# Patient Record
Sex: FEMALE | Race: WHITE | NOT HISPANIC OR LATINO | Employment: FULL TIME | URBAN - METROPOLITAN AREA
[De-identification: names, ages, dates, MRNs, and addresses within clinical notes are randomized per-mention and may not be internally consistent; named-entity substitution may affect disease eponyms.]

---

## 2022-05-25 ENCOUNTER — OFFICE VISIT (OUTPATIENT)
Dept: INTERNAL MEDICINE | Age: 22
End: 2022-05-25

## 2022-05-25 VITALS
WEIGHT: 119 LBS | HEIGHT: 61 IN | SYSTOLIC BLOOD PRESSURE: 120 MMHG | OXYGEN SATURATION: 98 % | HEART RATE: 118 BPM | BODY MASS INDEX: 22.47 KG/M2 | DIASTOLIC BLOOD PRESSURE: 80 MMHG | TEMPERATURE: 97.6 F

## 2022-05-25 DIAGNOSIS — F32.A DEPRESSIVE DISORDER: ICD-10-CM

## 2022-05-25 DIAGNOSIS — F41.9 ANXIETY: ICD-10-CM

## 2022-05-25 DIAGNOSIS — Z11.59 NEED FOR HEPATITIS C SCREENING TEST: ICD-10-CM

## 2022-05-25 DIAGNOSIS — Z00.00 ANNUAL PHYSICAL EXAM: Primary | ICD-10-CM

## 2022-05-25 PROBLEM — B00.1 HERPES LABIALIS: Status: ACTIVE | Noted: 2019-07-18

## 2022-05-25 PROBLEM — N92.6 IRREGULAR PERIODS: Status: ACTIVE | Noted: 2019-07-18

## 2022-05-25 PROCEDURE — 99385 PREV VISIT NEW AGE 18-39: CPT | Performed by: NURSE PRACTITIONER

## 2022-05-25 PROCEDURE — 99203 OFFICE O/P NEW LOW 30 MIN: CPT | Performed by: NURSE PRACTITIONER

## 2022-05-25 RX ORDER — DULOXETIN HYDROCHLORIDE 30 MG/1
CAPSULE, DELAYED RELEASE ORAL
COMMUNITY
End: 2022-05-25 | Stop reason: SDUPTHER

## 2022-05-25 RX ORDER — ACYCLOVIR 50 MG/G
1 OINTMENT TOPICAL 2 TIMES DAILY
Qty: 14 G | Refills: 0 | Status: SHIPPED | OUTPATIENT
Start: 2022-05-25 | End: 2022-06-01

## 2022-05-25 RX ORDER — DULOXETIN HYDROCHLORIDE 60 MG/1
CAPSULE, DELAYED RELEASE ORAL
COMMUNITY
End: 2022-05-25 | Stop reason: SDUPTHER

## 2022-05-25 RX ORDER — DESOGESTREL AND ETHINYL ESTRADIOL 0.15-0.03
KIT ORAL
COMMUNITY
End: 2023-02-21

## 2022-05-25 RX ORDER — DULOXETIN HYDROCHLORIDE 60 MG/1
60 CAPSULE, DELAYED RELEASE ORAL DAILY
Qty: 90 CAPSULE | Refills: 1 | Status: SHIPPED | OUTPATIENT
Start: 2022-05-25 | End: 2022-11-21 | Stop reason: SDUPTHER

## 2022-05-25 RX ORDER — DULOXETIN HYDROCHLORIDE 30 MG/1
30 CAPSULE, DELAYED RELEASE ORAL DAILY
Qty: 90 CAPSULE | Refills: 1 | Status: SHIPPED | OUTPATIENT
Start: 2022-05-25 | End: 2022-11-21 | Stop reason: SDUPTHER

## 2022-05-25 NOTE — PROGRESS NOTES
"    I N T E R N A L  M E D I C I N E  RONY Hernandez       ENCOUNTER DATE:  05/25/2022    Raven Lundy / 22 y.o. / female      CHIEF COMPLAINT     Annual Exam, establish care with new PCP, follow up Anxiety/Depression    VITALS     Vitals:    05/25/22 1051   BP: 120/80   Pulse: 118   Temp: 97.6 °F (36.4 °C)   TempSrc: Temporal   SpO2: 98%   Weight: 54 kg (119 lb)   Height: 154.9 cm (61\")       BP Readings from Last 3 Encounters:   05/25/22 120/80     Wt Readings from Last 3 Encounters:   05/25/22 54 kg (119 lb)      Body mass index is 22.48 kg/m².    Blood pressure readings recorded on patient flowsheet:  No flowsheet data found.       MEDICATIONS     Current Outpatient Medications on File Prior to Visit   Medication Sig Dispense Refill   • desogestrel-ethinyl estradiol (APRI) 0.15-30 MG-MCG per tablet Isibloom 0.15 mg-0.03 mg tablet   TAKE 1 TABLET BY MOUTH EVERY DAY       No current facility-administered medications on file prior to visit.         HISTORY OF PRESENT ILLNESS     Raven is a 22 year old female whom presents for annual health maintenance visit and to establish new PCP  Anxiety/Depression: patient recently moved to [place] from [place] to go to  School of [employer]. States psychiatrist in recent place of living prescribed Duloxetine and that the medication has  been effective. Discussion of Behavioral Health/Psychiatrist- patient asked if PCP could prescribe.     · General health: good  · Lifestyle:  · Attempting to lose weight?: No   · Diet: eats moderately healthy  · Exercise: exercises nearly every day  · Tobacco: Never used   · Alcohol: occasional/infrequent  · Work: Part-time  · Reproductive health:  · Sexually active?: No   · Sexual problems?: No problems  · Concern for STD?: No    · Sees Gynecologist?: Yes   · Desi/Postmenopausal?: No   · Domestic abuse concerns: No   · Depression Screening:      PHQ-2/PHQ-9 Depression Screening 5/25/2022   Little Interest or Pleasure in Doing Things 0-->not " at all   Feeling Down, Depressed or Hopeless 0-->not at all   PHQ-9: Brief Depression Severity Measure Score 0         PHQ-2: 0 (Not depressed)   PHQ-9: 0 (Negative screening for depression)    Patient Care Team:  Kelly Sandoval APRN as PCP - General (Family Medicine)      ALLERGIES  No Known Allergies     PFSH:     The following portions of the patient's history were reviewed and updated as appropriate: Allergies / Current Medications / Past Medical History / Surgical History / Social History / Family History    PROBLEM LIST   Patient Active Problem List   Diagnosis   • Anxiety   • Depressive disorder   • Herpes labialis   • Irregular periods       PAST MEDICAL HISTORY  Past Medical History:   Diagnosis Date   • Anxiety    • Depression        SURGICAL HISTORY  History reviewed. No pertinent surgical history.    SOCIAL HISTORY  Social History     Socioeconomic History   • Marital status: Single   Tobacco Use   • Smoking status: Never Smoker   • Smokeless tobacco: Never Used   Substance and Sexual Activity   • Drug use: Yes       FAMILY HISTORY  Family History   Problem Relation Age of Onset   • Hypertension Father    • Hyperlipidemia Father        IMMUNIZATION HISTORY  Immunization History   Administered Date(s) Administered   • COVID-19 (MODERNA) 1st, 2nd, 3rd Dose Only 01/17/2022   • DTaP, Unspecified 2002, 2002, 2002, 05/08/2003, 05/30/2007   • Flu Vaccine Split Quad 11/02/2021   • Fluzone Split Quad (Multi-dose) 10/01/2020   • HPV, Unspecified 06/05/2013   • Hep B, Adolescent or Pediatric 2002, 2002, 2002   • Hepb Hepatitis B Vaccine Heplisav-b 09/23/2021   • HiB 2002, 2002, 2002, 05/08/2003   • Hpv9 08/31/2017, 08/27/2019   • IPV 2002, 2002, 2002, 05/30/2007   • MMR 01/06/2003, 05/30/2007, 09/23/2021   • Meningococcal B,(Bexsero) 07/18/2019, 08/27/2019   • Meningococcal Conjugate 07/18/2019   • Meningococcal MCV4P (Menactra) 06/05/2013    • PEDS-Pneumococcal Conjugate (PCV7) 2002, 2002, 2002, 05/08/2003   • Tdap 06/05/2013   • Varicella 01/06/2003, 05/30/2007, 11/02/2021         REVIEW OF SYSTEMS     Review of Systems   Constitutional: Negative.    HENT: Negative.    Eyes: Negative.    Respiratory: Negative.    Cardiovascular: Negative.    Gastrointestinal: Negative.    Genitourinary: Negative.    Musculoskeletal: Negative.    Skin: Negative.    Allergic/Immunologic: Negative.    Neurological: Negative.    Psychiatric/Behavioral: The patient is nervous/anxious.        PHYSICAL EXAMINATION     Physical Exam  Constitutional:       Appearance: Normal appearance. She is normal weight.   HENT:      Head: Normocephalic and atraumatic.      Right Ear: Tympanic membrane normal.      Left Ear: Tympanic membrane normal.      Nose: Nose normal.      Mouth/Throat:      Mouth: Mucous membranes are moist.   Eyes:      Extraocular Movements: Extraocular movements intact.      Pupils: Pupils are equal, round, and reactive to light.   Cardiovascular:      Rate and Rhythm: Normal rate and regular rhythm.      Pulses: Normal pulses.      Heart sounds: Normal heart sounds.   Pulmonary:      Effort: Pulmonary effort is normal.      Breath sounds: Normal breath sounds.   Abdominal:      General: Abdomen is flat. Bowel sounds are normal.   Musculoskeletal:         General: Normal range of motion.      Cervical back: Normal range of motion and neck supple.   Skin:     General: Skin is warm and dry.      Capillary Refill: Capillary refill takes less than 2 seconds.   Neurological:      Mental Status: She is alert and oriented to person, place, and time.   Psychiatric:         Mood and Affect: Mood normal.         Behavior: Behavior normal.         Thought Content: Thought content normal.         Judgment: Judgment normal.         REVIEWED DATA      Labs:    Lab Results   Component Value Date     05/25/2022    K 4.4 05/25/2022    CALCIUM 9.9  05/25/2022    AST 16 05/25/2022    ALT 11 05/25/2022    BUN 10 05/25/2022    CREATININE 1.02 (H) 05/25/2022       Lab Results   Component Value Date    GLUCOSE 70 05/25/2022    HGBA1C 5.4 05/25/2022    TSH 1.220 05/25/2022    FREET4 1.09 05/25/2022       Lab Results   Component Value Date     (H) 05/25/2022    HDL 67 05/25/2022    TRIG 163 (H) 05/25/2022    CHOLHDLRATIO 3.4 05/25/2022       No results found for: [id number]     Lab Results   Component Value Date    WBC 7.0 05/25/2022    HGB 14.6 05/25/2022    MCV 87 05/25/2022     05/25/2022       Lab Results   Component Value Date    PROTEIN Trace 05/25/2022    GLUCOSEU Negative 05/25/2022    BLOODU Trace (A) 05/25/2022    NITRITEU Negative 05/25/2022    LEUKOCYTESUR Negative 05/25/2022          Lab Results   Component Value Date    HEPCVIRUSABY 0.1 05/25/2022       Imaging:        Medical Tests:        ASSESSMENT & PLAN     ANNUAL WELLNESS EXAM / PHYSICAL     Other medical problems addressed today:  Problem List Items Addressed This Visit        Mental Health    Anxiety    Depressive disorder    Relevant Medications    DULoxetine (CYMBALTA) 30 MG capsule    DULoxetine (CYMBALTA) 60 MG capsule      Other Visit Diagnoses     Annual physical exam    -  Primary    Relevant Orders    CBC & Differential (Completed)    Comprehensive Metabolic Panel (Completed)    Hemoglobin A1c (Completed)    Hepatitis C Antibody (Completed)    Lipid Panel With / Chol / HDL Ratio (Completed)    T4, Free (Completed)    TSH (Completed)    Urinalysis With Microscopic If Indicated (No Culture) - Urine, Clean Catch (Completed)    Need for hepatitis C screening test        Relevant Orders    Hepatitis C Antibody (Completed)          Summary/Discussion:     • Follow up OB/GYN for yearly Pap and Mammogram  • I spent 30 min in direct care of this patient on this date of service. This time includes times spent by me in the following activities: Preparing for the visit, obtaining and/or  reviewing a separately obtained history, performing a medically appropriate examination and/or evaluation, reviewing medical records, reviewing tests, ordering medications, tests, or procedures, counseling and educating the patient, documenting information in the medical record and reviewing office note/correspondence from other providers.     Return in about 6 months (around 11/25/2022) for Next scheduled follow up.      HEALTHCARE MAINTENANCE ISSUES     Cancer Screening:  · Colon: Initial/Next screening at age: 45  · Repeat colon cancer screening: N/A at this time  · Breast: Recommended monthly self exams; annual professional exam  · Mammogram: every 1 year  · Cervical: 1 year  · Skin: Monthly self skin examination, annual exam by health professional  · Lung: Does not meet criteria for lung cancer screening.   · Other:    Screening Labs & Tests:  · Lab results reviewed & discussed with the patient or test orders placed today.  · EKG:  · CV Screening: No special screening needed  · DEXA (65+ or postmenopausal with risk factors):   · HEP C (If born [birth year]0215, or risk factors): Not indicated  · Other:     Immunization/Vaccinations (to be given today unless deferred by patient)  · Influenza: Recommended annual influenza vaccine  · Hepatitis A: Up to date  · Hepatitis B: Up to date  · Tetanus/Pertussis: Up to date  · Pneumovax: Not needed at this time  · Shingles: Not needed at this time  · COVID: DEVORA BOBO COVID: Completed primary vaccine series and booster    Lifestyle Counseling:  · Lifestyle Modifications: Continue good lifestyle choices/modifications, Discussed better management of stress/anxiety and Discussed sexual issues, safe sex practices, contraception  · Safety Issues: Always wear seatbelt, Avoid texting while driving   · Use sunscreen, regular skin examination  · Recommended annual dental/vision examination.  · Emotional/Stress/Sleep: Reviewed and  given when appropriate      Health Maintenance   Topic  Date Due   • COVID-19 Vaccine (2 - Moderna series) 02/14/2022   • INFLUENZA VACCINE  08/01/2022   • LIPID PANEL  05/25/2023   • ANNUAL PHYSICAL  05/26/2023   • TDAP/TD VACCINES (2 - Td or Tdap) 06/05/2023   • HEPATITIS C SCREENING  Completed   • MENINGOCOCCAL VACCINE  Completed   • HPV VACCINES  Completed   • Pneumococcal Vaccine 0-64  Aged Out           *Examiner was wearing mask protection during the entire duration of the visit. Patient was masked the entire time. Minimum social distance of 6 ft maintained entire visit except if physical contact was necessary as documented.       Template created by RONY Hernandez

## 2022-05-26 DIAGNOSIS — E78.5 HYPERLIPIDEMIA LDL GOAL <100: Primary | ICD-10-CM

## 2022-05-26 LAB
ALBUMIN SERPL-MCNC: 4.4 G/DL (ref 3.9–5)
ALBUMIN/GLOB SERPL: 1.5 {RATIO} (ref 1.2–2.2)
ALP SERPL-CCNC: 73 IU/L (ref 42–106)
ALT SERPL-CCNC: 11 IU/L (ref 0–32)
APPEARANCE UR: CLEAR
AST SERPL-CCNC: 16 IU/L (ref 0–40)
BACTERIA #/AREA URNS HPF: NORMAL /[HPF]
BASOPHILS # BLD AUTO: 0 X10E3/UL (ref 0–0.2)
BASOPHILS NFR BLD AUTO: 0 %
BILIRUB SERPL-MCNC: 0.3 MG/DL (ref 0–1.2)
BILIRUB UR QL STRIP: NEGATIVE
BUN SERPL-MCNC: 10 MG/DL (ref 6–20)
BUN/CREAT SERPL: 10 (ref 9–23)
CALCIUM SERPL-MCNC: 9.9 MG/DL (ref 8.7–10.2)
CASTS URNS QL MICRO: NORMAL /LPF
CHLORIDE SERPL-SCNC: 102 MMOL/L (ref 96–106)
CHOLEST SERPL-MCNC: 231 MG/DL (ref 100–199)
CHOLEST/HDLC SERPL: 3.4 RATIO (ref 0–4.4)
CO2 SERPL-SCNC: 22 MMOL/L (ref 20–29)
COLOR UR: YELLOW
CREAT SERPL-MCNC: 1.02 MG/DL (ref 0.57–1)
EGFRCR SERPLBLD CKD-EPI 2021: 81 ML/MIN/1.73
EOSINOPHIL # BLD AUTO: 0.2 X10E3/UL (ref 0–0.4)
EOSINOPHIL NFR BLD AUTO: 3 %
EPI CELLS #/AREA URNS HPF: NORMAL /HPF (ref 0–10)
ERYTHROCYTE [DISTWIDTH] IN BLOOD BY AUTOMATED COUNT: 13.2 % (ref 11.7–15.4)
GLOBULIN SER CALC-MCNC: 2.9 G/DL (ref 1.5–4.5)
GLUCOSE SERPL-MCNC: 70 MG/DL (ref 65–99)
GLUCOSE UR QL STRIP: NEGATIVE
HBA1C MFR BLD: 5.4 % (ref 4.8–5.6)
HCT VFR BLD AUTO: 43.8 % (ref 34–46.6)
HCV AB S/CO SERPL IA: 0.1 S/CO RATIO (ref 0–0.9)
HDLC SERPL-MCNC: 67 MG/DL
HGB BLD-MCNC: 14.6 G/DL (ref 11.1–15.9)
HGB UR QL STRIP: ABNORMAL
IMM GRANULOCYTES # BLD AUTO: 0 X10E3/UL (ref 0–0.1)
IMM GRANULOCYTES NFR BLD AUTO: 0 %
KETONES UR QL STRIP: NEGATIVE
LDLC SERPL CALC-MCNC: 135 MG/DL (ref 0–99)
LEUKOCYTE ESTERASE UR QL STRIP: NEGATIVE
LYMPHOCYTES # BLD AUTO: 2.2 X10E3/UL (ref 0.7–3.1)
LYMPHOCYTES NFR BLD AUTO: 32 %
MCH RBC QN AUTO: 28.9 PG (ref 26.6–33)
MCHC RBC AUTO-ENTMCNC: 33.3 G/DL (ref 31.5–35.7)
MCV RBC AUTO: 87 FL (ref 79–97)
MICRO URNS: ABNORMAL
MONOCYTES # BLD AUTO: 0.4 X10E3/UL (ref 0.1–0.9)
MONOCYTES NFR BLD AUTO: 6 %
NEUTROPHILS # BLD AUTO: 4.1 X10E3/UL (ref 1.4–7)
NEUTROPHILS NFR BLD AUTO: 59 %
NITRITE UR QL STRIP: NEGATIVE
PH UR STRIP: 6 [PH] (ref 5–7.5)
PLATELET # BLD AUTO: 325 X10E3/UL (ref 150–450)
POTASSIUM SERPL-SCNC: 4.4 MMOL/L (ref 3.5–5.2)
PROT SERPL-MCNC: 7.3 G/DL (ref 6–8.5)
PROT UR QL STRIP: ABNORMAL
RBC # BLD AUTO: 5.06 X10E6/UL (ref 3.77–5.28)
RBC #/AREA URNS HPF: NORMAL /HPF (ref 0–2)
SODIUM SERPL-SCNC: 140 MMOL/L (ref 134–144)
SP GR UR STRIP: 1.02 (ref 1–1.03)
T4 FREE SERPL-MCNC: 1.09 NG/DL (ref 0.82–1.77)
TRIGL SERPL-MCNC: 163 MG/DL (ref 0–149)
TSH SERPL DL<=0.005 MIU/L-ACNC: 1.22 UIU/ML (ref 0.45–4.5)
UROBILINOGEN UR STRIP-MCNC: 0.2 MG/DL (ref 0.2–1)
VLDLC SERPL CALC-MCNC: 29 MG/DL (ref 5–40)
WBC # BLD AUTO: 7 X10E3/UL (ref 3.4–10.8)
WBC #/AREA URNS HPF: NORMAL /HPF (ref 0–5)

## 2022-11-21 ENCOUNTER — PATIENT ROUNDING (BHMG ONLY) (OUTPATIENT)
Dept: INTERNAL MEDICINE | Facility: CLINIC | Age: 22
End: 2022-11-21

## 2022-11-21 ENCOUNTER — OFFICE VISIT (OUTPATIENT)
Dept: INTERNAL MEDICINE | Facility: CLINIC | Age: 22
End: 2022-11-21

## 2022-11-21 DIAGNOSIS — E78.9 BORDERLINE HIGH CHOLESTEROL: ICD-10-CM

## 2022-11-21 DIAGNOSIS — B00.1 HERPES LABIALIS WITHOUT COMPLICATION: Primary | Chronic | ICD-10-CM

## 2022-11-21 DIAGNOSIS — F41.9 ANXIETY: Chronic | ICD-10-CM

## 2022-11-21 PROCEDURE — 99214 OFFICE O/P EST MOD 30 MIN: CPT | Performed by: INTERNAL MEDICINE

## 2022-11-21 RX ORDER — DULOXETIN HYDROCHLORIDE 60 MG/1
60 CAPSULE, DELAYED RELEASE ORAL DAILY
Qty: 90 CAPSULE | Refills: 1 | Status: SHIPPED | OUTPATIENT
Start: 2022-11-21 | End: 2023-03-30

## 2022-11-21 RX ORDER — VALACYCLOVIR HYDROCHLORIDE 1 G/1
1000 TABLET, FILM COATED ORAL 2 TIMES DAILY
Qty: 30 TABLET | Refills: 0 | Status: SHIPPED | OUTPATIENT
Start: 2022-11-21

## 2022-11-21 RX ORDER — DULOXETIN HYDROCHLORIDE 30 MG/1
30 CAPSULE, DELAYED RELEASE ORAL DAILY
Qty: 90 CAPSULE | Refills: 1 | Status: SHIPPED | OUTPATIENT
Start: 2022-11-21 | End: 2023-03-30

## 2022-11-21 NOTE — PROGRESS NOTES
"Chief Complaint  Depression and Anxiety (New PT )    Subjective        Raven Lundy presents to [facility]  History of Present Illness  23 yo woman here to establish- has been told she has high cholesterol -   She has been treated for anxiety for several years- sees a therapist as well but looking for someone local.  Feels controlled with meds.    Discussed safe sex, alcohol use.     Objective   Vital Signs:  BP 98/66   Pulse 70   Ht 154.9 cm (61\")   Wt 54 kg (119 lb)   BMI 22.48 kg/m²   Estimated body mass index is 22.48 kg/m² as calculated from the following:    Height as of this encounter: 154.9 cm (61\").    Weight as of this encounter: 54 kg (119 lb).    BMI is within normal parameters. No other follow-up for BMI required.      Physical Exam  Constitutional:       Appearance: Normal appearance.   HENT:      Head: Normocephalic.   Cardiovascular:      Rate and Rhythm: Normal rate and regular rhythm.   Pulmonary:      Effort: Pulmonary effort is normal.      Breath sounds: Normal breath sounds.   Abdominal:      General: Abdomen is flat. Bowel sounds are normal.      Palpations: Abdomen is soft.   Musculoskeletal:      Right lower leg: No edema.      Left lower leg: No edema.   Skin:     General: Skin is warm and dry.   Psychiatric:         Mood and Affect: Mood normal.         Thought Content: Thought content normal.        Result Review :                Assessment and Plan   Diagnoses and all orders for this visit:    1. Herpes labialis without complication (Primary)  Comments:  rx for Valtrex for prn use given.  Call with recurrent problems.   Orders:  -     valACYclovir (Valtrex) 1000 MG tablet; Take 1 tablet by mouth 2 (Two) Times a Day.  Dispense: 30 tablet; Refill: 0    2. Anxiety  Comments:  Controlled, will help her find therapist locally.   Orders:  -     DULoxetine (CYMBALTA) 60 MG capsule; Take 1 capsule by mouth Daily.  Dispense: 90 capsule; Refill: 1  -     " DULoxetine (CYMBALTA) 30 MG capsule; Take 1 capsule by mouth Daily.  Dispense: 90 capsule; Refill: 1    3. Borderline high cholesterol  Comments:  reviewed labs extensively, they are good at this point- continue healthy lifestyle- recheck in a year.              Follow Up   No follow-ups on file.  Patient was given instructions and counseling regarding her condition or for health maintenance advice. Please see specific information pulled into the AVS if appropriate.

## 2022-11-21 NOTE — PROGRESS NOTES
November 21, 2022    Hello, may I speak with Raven Lundy?    My name is Ravi      I am  with [facility]  [address] [place] [phone].    Before we get started may I verify your date of birth? 2000    I am calling to officially welcome you to our practice and ask about your recent visit. Is this a good time to talk? yes    Tell me about your visit with us. What things went well?  Everything       We're always looking for ways to make our patients' experiences even better. Do you have recommendations on ways we may improve?  no    Overall were you satisfied with your first visit to our practice? yes       I appreciate you taking the time to speak with me today. Is there anything else I can do for you? no      Thank you, and have a great day.

## 2022-11-22 VITALS
BODY MASS INDEX: 22.47 KG/M2 | HEART RATE: 70 BPM | DIASTOLIC BLOOD PRESSURE: 66 MMHG | WEIGHT: 119 LBS | SYSTOLIC BLOOD PRESSURE: 98 MMHG | HEIGHT: 61 IN

## 2023-01-24 ENCOUNTER — OFFICE VISIT (OUTPATIENT)
Dept: GASTROENTEROLOGY | Facility: CLINIC | Age: 23
End: 2023-01-24
Payer: COMMERCIAL

## 2023-01-24 ENCOUNTER — CLINICAL SUPPORT (OUTPATIENT)
Dept: INTERNAL MEDICINE | Facility: CLINIC | Age: 23
End: 2023-01-24
Payer: COMMERCIAL

## 2023-01-24 VITALS
SYSTOLIC BLOOD PRESSURE: 116 MMHG | WEIGHT: 129.8 LBS | DIASTOLIC BLOOD PRESSURE: 76 MMHG | HEART RATE: 96 BPM | TEMPERATURE: 97.3 F | BODY MASS INDEX: 24.51 KG/M2 | HEIGHT: 61 IN

## 2023-01-24 DIAGNOSIS — R15.2 FECAL URGENCY: ICD-10-CM

## 2023-01-24 DIAGNOSIS — R10.30 LOWER ABDOMINAL PAIN: ICD-10-CM

## 2023-01-24 DIAGNOSIS — R19.7 DIARRHEA, UNSPECIFIED TYPE: Primary | ICD-10-CM

## 2023-01-24 PROCEDURE — 99214 OFFICE O/P EST MOD 30 MIN: CPT | Performed by: NURSE PRACTITIONER

## 2023-01-24 NOTE — PROGRESS NOTES
Chief Complaint   Patient presents with   • Diarrhea       Raven Lundy is a 23 y.o. female who presents with diarrhea.    HPI  23-year-old female presents today as a new patient to our clinic with worsening diarrhea.  She will be establishing with myself and Dr. Fletcher.  She tells me for the last several months she has had worsening diarrhea with fecal urgency every time she eats.  She tells me she occasionally had diarrhea in the past but nothing like it is now.  She does go to the [employer] for classes and admits when she was home over winter break her parents told her that her bowels were definitely not normal and she should get that checked.  She tells me it does not really seem to matter what she eats she will have diarrhea with abdominal pain and cramping, gas and bloating with fecal urgency.  She has never had an EGD or screening colonoscopy before.  She denies any new medications or any recent antibiotics.  She does wonder if maybe dairy is involved?  She denies any dysphagia, reflux, nausea and vomiting, rectal bleeding or melena.  She admits her appetite is good and her weight is stable.  She denies any significant GI family history at this time.  She does not take any probiotics or fiber supplementation.  She has not tried any medications over-the-counter because she tells me it is just so random she does not know when it is going to be bad.  Past Medical History:   Diagnosis Date   • Anxiety    • Depression        History reviewed. No pertinent surgical history.      Current Outpatient Medications:   •  desogestrel-ethinyl estradiol (APRI) 0.15-30 MG-MCG per tablet, Isibloom 0.15 mg-0.03 mg tablet  TAKE 1 TABLET BY MOUTH EVERY DAY, Disp: , Rfl:   •  DULoxetine (CYMBALTA) 30 MG capsule, Take 1 capsule by mouth Daily., Disp: 90 capsule, Rfl: 1  •  DULoxetine (CYMBALTA) 60 MG capsule, Take 1 capsule by mouth Daily., Disp: 90 capsule, Rfl: 1  •  valACYclovir (Valtrex) 1000 MG tablet, Take  1 tablet by mouth 2 (Two) Times a Day., Disp: 30 tablet, Rfl: 0    No Known Allergies    Social History     Socioeconomic History   • Marital status: Single   Tobacco Use   • Smoking status: Former     Types: Electronic Cigarette   • Smokeless tobacco: Never   Substance and Sexual Activity   • Alcohol use: Yes     Alcohol/week: 9.0 standard drinks     Types: 4 Glasses of wine, 5 Shots of liquor per week     Comment: socially   • Drug use: Yes   • Sexual activity: Yes     Partners: Male     Birth control/protection: Pill       Family History   Problem Relation Age of Onset   • Anxiety disorder Mother    • Hypertension Father    • Hyperlipidemia Father    • No Known Problems Sister    • Alcohol abuse Paternal Grandfather        Review of Systems   Constitutional: Negative for appetite change, chills, diaphoresis, fatigue, fever and unexpected weight change.   HENT: Negative for nosebleeds, postnasal drip, sore throat, trouble swallowing and voice change.    Respiratory: Negative for cough, choking, chest tightness, shortness of breath, wheezing and stridor.    Cardiovascular: Negative for chest pain, palpitations and leg swelling.   Gastrointestinal: Positive for abdominal pain and diarrhea. Negative for abdominal distention, anal bleeding, blood in stool, constipation, nausea, rectal pain and vomiting.   Endocrine: Negative for polydipsia, polyphagia and polyuria.   Musculoskeletal: Negative for gait problem.   Skin: Negative for rash and wound.   Allergic/Immunologic: Negative for food allergies.   Neurological: Negative for dizziness, speech difficulty and light-headedness.   Psychiatric/Behavioral: Negative for confusion, self-injury, sleep disturbance and suicidal ideas.       Vitals:    01/24/23 0928   BP: 116/76   Pulse: 96   Temp: 97.3 °F (36.3 °C)       Physical Exam  Constitutional:       General: She is not in acute distress.     Appearance: She is well-developed. She is not ill-appearing.   HENT:       Head: Normocephalic.   Eyes:      Pupils: Pupils are equal, round, and reactive to light.   Cardiovascular:      Rate and Rhythm: Normal rate and regular rhythm.      Heart sounds: Normal heart sounds.   Pulmonary:      Effort: Pulmonary effort is normal.      Breath sounds: Normal breath sounds.   Abdominal:      General: Bowel sounds are normal. There is no distension.      Palpations: Abdomen is soft. There is no mass.      Tenderness: There is no abdominal tenderness. There is no guarding or rebound.      Hernia: No hernia is present.   Musculoskeletal:         General: Normal range of motion.   Skin:     General: Skin is warm and dry.   Neurological:      Mental Status: She is alert and oriented to person, place, and time.   Psychiatric:         Speech: Speech normal.         Behavior: Behavior normal.         Judgment: Judgment normal.         No radiology results for the last 7 days       Assessment and plan    1. Diarrhea, unspecified type  - Food Allergy Profile  - Celiac Comprehensive Panel  - Gastrointestinal Panel, PCR - Stool, Per Rectum  - Clostridioides difficile Toxin, PCR - Stool, Per Rectum  - Calprotectin, Fecal - Stool, Per Rectum    2. Fecal urgency  - Food Allergy Profile  - Celiac Comprehensive Panel  - Gastrointestinal Panel, PCR - Stool, Per Rectum  - Clostridioides difficile Toxin, PCR - Stool, Per Rectum  - Calprotectin, Fecal - Stool, Per Rectum    3. Lower abdominal pain  - Food Allergy Profile  - Celiac Comprehensive Panel  - Gastrointestinal Panel, PCR - Stool, Per Rectum  - Clostridioides difficile Toxin, PCR - Stool, Per Rectum  - Calprotectin, Fecal - Stool, Per Rectum    Reviewed medical history with her today.  Having worsening diarrhea with fecal urgency after she eats.  I do wonder if there is a food sensitivity involved here?  We will check a food allergy profile and a celiac panel to start.  We will also do some stool studies.  Would like her to start daily fiber  supplementation.  Recommend a high-fiber diet.  Patient to call the office next week with an update.  Patient to follow-up with me in 1 month.  Patient is agreeable to the plan.

## 2023-01-25 LAB
ENDOMYSIUM IGA SER QL: NEGATIVE
GLIADIN PEPTIDE IGA SER-ACNC: 7 UNITS (ref 0–19)
GLIADIN PEPTIDE IGG SER-ACNC: 4 UNITS (ref 0–19)
IGA SERPL-MCNC: 189 MG/DL (ref 87–352)
TTG IGA SER-ACNC: <2 U/ML (ref 0–3)
TTG IGG SER-ACNC: <2 U/ML (ref 0–5)

## 2023-01-27 LAB
CLAM IGE QN: <0.1 KU/L
CODFISH IGE QN: <0.1 KU/L
CONV CLASS DESCRIPTION: NORMAL
CORN IGE QN: <0.1 KU/L
COW MILK IGE QN: <0.1 KU/L
EGG WHITE IGE QN: <0.1 KU/L
PEANUT IGE QN: <0.1 KU/L
SCALLOP IGE QN: <0.1 KU/L
SESAME SEED IGE QN: <0.1 KU/L
SHRIMP IGE QN: <0.1 KU/L
SOYBEAN IGE QN: <0.1 KU/L
WALNUT IGE QN: <0.1 KU/L
WHEAT IGE QN: <0.1 KU/L

## 2023-01-30 ENCOUNTER — TELEPHONE (OUTPATIENT)
Dept: GASTROENTEROLOGY | Facility: CLINIC | Age: 23
End: 2023-01-30
Payer: COMMERCIAL

## 2023-01-30 NOTE — TELEPHONE ENCOUNTER
----- Message from RONY Gonzalez sent at 1/27/2023  1:01 PM EST -----  Food allergy profile is normal.  Celiac profile is normal.  Thanks

## 2023-02-03 ENCOUNTER — OFFICE VISIT (OUTPATIENT)
Dept: INTERNAL MEDICINE | Facility: CLINIC | Age: 23
End: 2023-02-03
Payer: COMMERCIAL

## 2023-02-03 VITALS
HEIGHT: 61 IN | BODY MASS INDEX: 24.35 KG/M2 | DIASTOLIC BLOOD PRESSURE: 70 MMHG | SYSTOLIC BLOOD PRESSURE: 112 MMHG | TEMPERATURE: 98.2 F | WEIGHT: 129 LBS

## 2023-02-03 DIAGNOSIS — F32.A ANXIETY AND DEPRESSION: Primary | ICD-10-CM

## 2023-02-03 DIAGNOSIS — F41.9 ANXIETY AND DEPRESSION: Primary | ICD-10-CM

## 2023-02-03 PROCEDURE — 99213 OFFICE O/P EST LOW 20 MIN: CPT | Performed by: PHYSICIAN ASSISTANT

## 2023-02-03 RX ORDER — VENLAFAXINE HYDROCHLORIDE 37.5 MG/1
37.5 CAPSULE, EXTENDED RELEASE ORAL DAILY
Qty: 90 CAPSULE | Refills: 0 | Status: SHIPPED | OUTPATIENT
Start: 2023-02-03 | End: 2023-03-30

## 2023-02-03 RX ORDER — LEVOMEFOLATE/ALGAL OIL 7.5-90.314
1 CAPSULE ORAL DAILY
Qty: 90 CAPSULE | Refills: 1 | Status: SHIPPED | OUTPATIENT
Start: 2023-02-03 | End: 2023-03-30

## 2023-02-03 NOTE — PROGRESS NOTES
Subjective   Chief Complaint   Patient presents with   • Anxiety       History of Present Illness      Pt is here today for fup on her anxiety and depression. She had genesight testing done. She has been on Cymbalta for about 2 years and does not feel it is very effective. She reports being moodier during her cycle. Her mother takes Effexor and has done well with it.   Patient Active Problem List   Diagnosis   • Anxiety   • Depressive disorder   • Herpes labialis   • Irregular periods       No Known Allergies    Current Outpatient Medications on File Prior to Visit   Medication Sig Dispense Refill   • DULoxetine (CYMBALTA) 30 MG capsule Take 1 capsule by mouth Daily. 90 capsule 1   • DULoxetine (CYMBALTA) 60 MG capsule Take 1 capsule by mouth Daily. 90 capsule 1   • Unable to find 1 each 1 (One) Time. IUD     • valACYclovir (Valtrex) 1000 MG tablet Take 1 tablet by mouth 2 (Two) Times a Day. 30 tablet 0   • desogestrel-ethinyl estradiol (APRI) 0.15-30 MG-MCG per tablet Isibloom 0.15 mg-0.03 mg tablet   TAKE 1 TABLET BY MOUTH EVERY DAY       No current facility-administered medications on file prior to visit.       Past Medical History:   Diagnosis Date   • Anxiety    • Depression        Family History   Problem Relation Age of Onset   • Anxiety disorder Mother    • Hypertension Father    • Hyperlipidemia Father    • No Known Problems Sister    • Alcohol abuse Paternal Grandfather        Social History     Socioeconomic History   • Marital status: Single   Tobacco Use   • Smoking status: Former     Types: Electronic Cigarette   • Smokeless tobacco: Never   Substance and Sexual Activity   • Alcohol use: Yes     Alcohol/week: 9.0 standard drinks     Types: 4 Glasses of wine, 5 Shots of liquor per week     Comment: socially   • Drug use: Yes   • Sexual activity: Yes     Partners: Male     Birth control/protection: Pill       History reviewed. No pertinent surgical history.      The following portions of the patient's  "history were reviewed and updated as appropriate: problem list, allergies, current medications, past medical history, past family history, past social history and past surgical history.    ROS  See HPI  Immunization History   Administered Date(s) Administered   • COVID-19 (MODERNA) 1st, 2nd, 3rd Dose Only 01/17/2022   • DTaP, Unspecified 2002, 2002, 2002, 05/08/2003, 05/30/2007   • Flu Vaccine Split Quad 11/02/2021   • Flublok 18+yrs 10/13/2022   • Fluzone Quad >6mos (Multi-dose) 10/01/2020   • HPV, Unspecified 06/05/2013   • Hep B, Adolescent or Pediatric 2002, 2002, 2002   • Hepb Hepatitis B Vaccine Heplisav-b 09/23/2021   • HiB 2002, 2002, 2002, 05/08/2003   • Hpv9 08/31/2017, 08/27/2019   • IPV 2002, 2002, 2002, 05/30/2007   • MMR 01/06/2003, 05/30/2007, 09/23/2021   • Meningococcal B,(Bexsero) 07/18/2019, 08/27/2019   • Meningococcal Conjugate 07/18/2019   • Meningococcal MCV4P (Menactra) 06/05/2013   • PEDS-Pneumococcal Conjugate (PCV7) 2002, 2002, 2002, 05/08/2003   • Tdap 06/05/2013   • Varicella 01/06/2003, 05/30/2007, 11/02/2021       Objective   Vitals:    02/03/23 1542   BP: 112/70   Temp: 98.2 °F (36.8 °C)   Weight: 58.5 kg (129 lb)   Height: 154.9 cm (60.98\")     Body mass index is 24.39 kg/m².  Physical Exam  Vitals reviewed.   Constitutional:       Appearance: Normal appearance.   HENT:      Head: Normocephalic and atraumatic.   Cardiovascular:      Rate and Rhythm: Normal rate and regular rhythm.      Heart sounds: Normal heart sounds.   Neurological:      Mental Status: She is alert.           Assessment & Plan   Diagnoses and all orders for this visit:    1. Anxiety and depression (Primary)  -     venlafaxine XR (Effexor XR) 37.5 MG 24 hr capsule; Take 1 capsule by mouth Daily.  Dispense: 90 capsule; Refill: 0  -     L-Methylfolate-Algae (L-Methylfolate Forte) 7.5-90.314 MG capsule capsule; Take 1 capsule by " mouth Daily.  Dispense: 90 capsule; Refill: 1    Wean off the Cymbalta, written directions given. Will start the Effexor after, she has reduced folic acid conversion will add low dose L methylfolate as well. Will have her fup with Dr. Sexton in 8 weeks. Call with any questions    Return in about 8 weeks (around 3/31/2023).

## 2023-02-16 LAB — C DIFF TOX GENS STL QL NAA+PROBE: NEGATIVE

## 2023-02-18 LAB — CALPROTECTIN STL-MCNT: <16 UG/G (ref 0–120)

## 2023-02-19 LAB
ADV 40+41 DNA STL QL NAA+NON-PROBE: NOT DETECTED
ASTRO TYP 1-8 RNA STL QL NAA+NON-PROBE: NOT DETECTED
C CAYETANENSIS DNA STL QL NAA+NON-PROBE: NOT DETECTED
C COLI+JEJ+UPSA DNA STL QL NAA+NON-PROBE: NOT DETECTED
C DIF TOX TCDA+TCDB STL QL NAA+NON-PROBE: NOT DETECTED
CRYPTOSP DNA STL QL NAA+NON-PROBE: NOT DETECTED
E COLI O157 DNA STL QL NAA+NON-PROBE: NORMAL
E HISTOLYT DNA STL QL NAA+NON-PROBE: NOT DETECTED
EAEC PAA PLAS AGGR+AATA ST NAA+NON-PRB: NOT DETECTED
EC STX1+STX2 GENES STL QL NAA+NON-PROBE: NOT DETECTED
EPEC EAE GENE STL QL NAA+NON-PROBE: NOT DETECTED
ETEC LTA+ST1A+ST1B TOX ST NAA+NON-PROBE: NOT DETECTED
G LAMBLIA DNA STL QL NAA+NON-PROBE: NOT DETECTED
NOROVIRUS GI+II RNA STL QL NAA+NON-PROBE: NOT DETECTED
P SHIGELLOIDES DNA STL QL NAA+NON-PROBE: NOT DETECTED
RVA RNA STL QL NAA+NON-PROBE: NOT DETECTED
S ENT+BONG DNA STL QL NAA+NON-PROBE: NOT DETECTED
SAPO I+II+IV+V RNA STL QL NAA+NON-PROBE: NOT DETECTED
SHIGELLA SP+EIEC IPAH ST NAA+NON-PROBE: NOT DETECTED
V CHOL+PARA+VUL DNA STL QL NAA+NON-PROBE: NOT DETECTED
V CHOLERAE DNA STL QL NAA+NON-PROBE: NOT DETECTED
Y ENTEROCOL DNA STL QL NAA+NON-PROBE: NOT DETECTED

## 2023-02-21 ENCOUNTER — OFFICE VISIT (OUTPATIENT)
Dept: GASTROENTEROLOGY | Facility: CLINIC | Age: 23
End: 2023-02-21
Payer: COMMERCIAL

## 2023-02-21 VITALS
BODY MASS INDEX: 24.62 KG/M2 | TEMPERATURE: 97.3 F | HEART RATE: 78 BPM | OXYGEN SATURATION: 98 % | DIASTOLIC BLOOD PRESSURE: 69 MMHG | SYSTOLIC BLOOD PRESSURE: 102 MMHG | WEIGHT: 130.4 LBS | HEIGHT: 61 IN

## 2023-02-21 DIAGNOSIS — R15.2 FECAL URGENCY: ICD-10-CM

## 2023-02-21 DIAGNOSIS — R19.7 DIARRHEA, UNSPECIFIED TYPE: Primary | ICD-10-CM

## 2023-02-21 DIAGNOSIS — R10.30 LOWER ABDOMINAL PAIN: ICD-10-CM

## 2023-02-21 PROCEDURE — 99213 OFFICE O/P EST LOW 20 MIN: CPT | Performed by: NURSE PRACTITIONER

## 2023-02-21 RX ORDER — FLUTICASONE PROPIONATE 50 MCG
SPRAY, SUSPENSION (ML) NASAL
COMMUNITY
Start: 2023-02-14

## 2023-02-21 RX ORDER — LEVONORGESTREL 19.5 MG/1
1 INTRAUTERINE DEVICE INTRAUTERINE ONCE
COMMUNITY

## 2023-02-21 NOTE — PROGRESS NOTES
Chief Complaint   Patient presents with   • Diarrhea       Raven Lundy is a  23 y.o. female here for a follow up visit for diarrhea.    HPI  23-year-old female presents today for follow-up visit for diarrhea.  She is a patient of Dr. Fletcher.  She was last seen in the office by me on 1/24/2023.  She has a history of diarrhea with fecal urgency and lower abdominal pain and cramping.  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  She tells me the powder seems to be bulking up her stool and making her more regular.  She is no longer having diarrhea or fecal urgency on the days that she drinks this shake.  She did recently have stool studies done which were normal.  Food allergy profile and celiac profile were normal.  Most recent labs were also normal.  She has never had an EGD or screening colonoscopy before.  She denies any significant GI family history at this time.  She denies any dysphagia, reflux, nausea and vomiting, rectal bleeding or melena.  She admits her appetite is good and her weight is up 11 pounds since November of last year.  Past Medical History:   Diagnosis Date   • Anxiety    • Depression        History reviewed. No pertinent surgical history.    Scheduled Meds:    Continuous Infusions:No current facility-administered medications for this visit.      PRN Meds:.    No Known Allergies    Social History     Socioeconomic History   • Marital status: Single   Tobacco Use   • Smoking status: Former     Types: Electronic Cigarette   • Smokeless tobacco: Never   Substance and Sexual Activity   • Alcohol use: Yes     Alcohol/week: 9.0 standard drinks     Types: 4 Glasses of wine, 5 Shots of liquor per week     Comment: socially   • Drug use: Not Currently   • Sexual activity: Yes     Partners: Male     Birth control/protection: I.U.D.       Family History   Problem Relation Age of Onset   • Anxiety disorder Mother     • Hypertension Father    • Hyperlipidemia Father    • No Known Problems Sister    • Alcohol abuse Paternal Grandfather        Review of Systems   Constitutional: Negative for appetite change, chills, diaphoresis, fatigue, fever and unexpected weight change.   HENT: Negative for nosebleeds, postnasal drip, sore throat, trouble swallowing and voice change.    Respiratory: Negative for cough, choking, chest tightness, shortness of breath, wheezing and stridor.    Cardiovascular: Negative for chest pain, palpitations and leg swelling.   Gastrointestinal: Negative for abdominal distention, abdominal pain, anal bleeding, blood in stool, constipation, diarrhea, nausea, rectal pain and vomiting.   Endocrine: Negative for polydipsia, polyphagia and polyuria.   Musculoskeletal: Negative for gait problem.   Skin: Negative for rash and wound.   Allergic/Immunologic: Negative for food allergies.   Neurological: Negative for dizziness, speech difficulty and light-headedness.   Psychiatric/Behavioral: Negative for confusion, self-injury, sleep disturbance and suicidal ideas.       Vitals:    02/21/23 0823   BP: 102/69   Pulse: 78   Temp: 97.3 °F (36.3 °C)   SpO2: 98%       Physical Exam  Constitutional:       General: She is not in acute distress.     Appearance: She is well-developed. She is not ill-appearing.   HENT:      Head: Normocephalic.   Eyes:      Pupils: Pupils are equal, round, and reactive to light.   Cardiovascular:      Rate and Rhythm: Normal rate and regular rhythm.      Heart sounds: Normal heart sounds.   Pulmonary:      Effort: Pulmonary effort is normal.      Breath sounds: Normal breath sounds.   Abdominal:      General: Bowel sounds are normal. There is no distension.      Palpations: Abdomen is soft. There is no mass.      Tenderness: There is no abdominal tenderness. There is no guarding or rebound.      Hernia: No hernia is present.   Musculoskeletal:         General: Normal range of motion.   Skin:      General: Skin is warm and dry.   Neurological:      Mental Status: She is alert and oriented to person, place, and time.   Psychiatric:         Speech: Speech normal.         Behavior: Behavior normal.         Judgment: Judgment normal.         No radiology results for the last 7 days     Diagnoses and all orders for this visit:    1. Diarrhea, unspecified type (Primary)    2. Fecal urgency    3. Lower abdominal pain       Reviewed stool studies and lab results with her today.  Stool studies were all normal.  Celiac profile and food allergy profile were normal.  Labs were also normal.  Sounds like she is doing significantly better on her fiber/probiotic powder that she puts into her morning shakes.  Sounds like the added fiber has helped eliminate the diarrhea and given her more solid stools.  She also is happy to report there is no more fecal urgency with it.  Overall it sounds like she is doing much better.  Patient to call the office with any issues.  Patient to follow-up in 3 months.  Patient is agreeable to the plan.

## 2023-03-30 ENCOUNTER — OFFICE VISIT (OUTPATIENT)
Dept: INTERNAL MEDICINE | Facility: CLINIC | Age: 23
End: 2023-03-30
Payer: COMMERCIAL

## 2023-03-30 VITALS — BODY MASS INDEX: 25.11 KG/M2 | WEIGHT: 133 LBS | HEIGHT: 61 IN

## 2023-03-30 DIAGNOSIS — F41.9 ANXIETY: Primary | ICD-10-CM

## 2023-03-30 PROCEDURE — 99213 OFFICE O/P EST LOW 20 MIN: CPT | Performed by: INTERNAL MEDICINE

## 2023-03-30 RX ORDER — FLUOXETINE HYDROCHLORIDE 20 MG/1
20 CAPSULE ORAL DAILY
Qty: 90 CAPSULE | Refills: 1 | Status: SHIPPED | OUTPATIENT
Start: 2023-03-30

## 2023-03-30 NOTE — PROGRESS NOTES
"Chief Complaint  Anxiety    Subjective        Raven Lundy presents to [facility]  History of Present Illness  Stopped taking her meds- didn't like the idea of the Effexor and would rather try fluoxetine.  She thinks she has some OCD tendencies that could be treated as well.      Objective   Vital Signs:  Ht 154.9 cm (61\")   Wt 60.3 kg (133 lb)   BMI 25.13 kg/m²   Estimated body mass index is 25.13 kg/m² as calculated from the following:    Height as of this encounter: 154.9 cm (61\").    Weight as of this encounter: 60.3 kg (133 lb).             Physical Exam  Constitutional:       Appearance: Normal appearance.   Pulmonary:      Effort: Pulmonary effort is normal.   Psychiatric:         Mood and Affect: Mood normal.         Thought Content: Thought content normal.        Result Review :                   Assessment and Plan   Diagnoses and all orders for this visit:    1. Anxiety (Primary)  Comments:  agree with holding meds for now- start fluoxetine after school ends for summer- start at 20 mg and add as needed. recheck @ 6+ months.     Other orders  -     FLUoxetine (PROzac) 20 MG capsule; Take 1 capsule by mouth Daily.  Dispense: 90 capsule; Refill: 1             Follow Up   No follow-ups on file.  Patient was given instructions and counseling regarding her condition or for health maintenance advice. Please see specific information pulled into the AVS if appropriate.       "

## 2023-11-14 RX ORDER — FLUOXETINE HYDROCHLORIDE 20 MG/1
20 CAPSULE ORAL DAILY
Qty: 90 CAPSULE | Refills: 1 | Status: SHIPPED | OUTPATIENT
Start: 2023-11-14

## 2024-06-18 ENCOUNTER — OFFICE VISIT (OUTPATIENT)
Dept: INTERNAL MEDICINE | Facility: CLINIC | Age: 24
End: 2024-06-18
Payer: COMMERCIAL

## 2024-06-18 VITALS
BODY MASS INDEX: 23.79 KG/M2 | SYSTOLIC BLOOD PRESSURE: 114 MMHG | WEIGHT: 126 LBS | DIASTOLIC BLOOD PRESSURE: 76 MMHG | HEART RATE: 80 BPM | HEIGHT: 61 IN

## 2024-06-18 DIAGNOSIS — F41.9 ANXIETY: ICD-10-CM

## 2024-06-18 DIAGNOSIS — Z00.00 PHYSICAL EXAM, ANNUAL: Primary | ICD-10-CM

## 2024-06-18 PROCEDURE — 99395 PREV VISIT EST AGE 18-39: CPT | Performed by: INTERNAL MEDICINE

## 2024-06-18 RX ORDER — FLUOXETINE HYDROCHLORIDE 20 MG/1
20 CAPSULE ORAL DAILY
Qty: 90 CAPSULE | Refills: 4 | Status: SHIPPED | OUTPATIENT
Start: 2024-06-18

## 2024-06-18 RX ORDER — FLUOXETINE 10 MG/1
10 CAPSULE ORAL DAILY
Qty: 90 CAPSULE | Refills: 4 | Status: SHIPPED | OUTPATIENT
Start: 2024-06-18

## 2024-06-18 NOTE — PROGRESS NOTES
Subjective   Raven Lundy is a 24 y.o. female and is here for a comprehensive physical exam. The patient reports problems - anxiety .  Has episodes at work where she feels like she may pass out- if she rests for a few seconds it passes- after 5-10 minutes. Feels chest pounding, sweaty- has never passed out. Mainly at work but can be other times - usually in a social situation. Can have a week with no sx and then a week with several.   She does not exercise much- sallie with night shift- does not happen at that time.   Pt is UTD with annual gyn exam -           Social History     Socioeconomic History    Marital status: Single   Tobacco Use    Smoking status: Former     Types: Electronic Cigarette    Smokeless tobacco: Never   Substance and Sexual Activity    Alcohol use: Yes     Alcohol/week: 9.0 standard drinks of alcohol     Types: 4 Glasses of wine, 5 Shots of liquor per week     Comment: socially    Drug use: Not Currently    Sexual activity: Yes     Partners: Male     Birth control/protection: I.U.D.       Family History   Problem Relation Age of Onset    Anxiety disorder Mother     Hypertension Father     Hyperlipidemia Father     No Known Problems Sister     Alcohol abuse Paternal Grandfather           Past Medical History:   Diagnosis Date    Anxiety     Depression           Current Outpatient Medications:     FLUoxetine (PROzac) 20 MG capsule, Take 1 capsule by mouth Daily., Disp: 90 capsule, Rfl: 4    fluticasone (FLONASE) 50 MCG/ACT nasal spray, INSTILL 1 SPRAY INTO EACH NOSTRIL TWICE DAILY, Disp: , Rfl:     levonorgestrel (Kyleena) 19.5 MG intrauterine device IUD, To be inserted one time by prescriber. Route intrauterine., Disp: , Rfl:     valACYclovir (Valtrex) 1000 MG tablet, Take 1 tablet by mouth 2 (Two) Times a Day., Disp: 30 tablet, Rfl: 0    FLUoxetine (PROzac) 10 MG capsule, Take 1 capsule by mouth Daily., Disp: 90 capsule, Rfl: 4    Review of Systems    Review of Systems     There were no vitals  "filed for this visit.    Vitals:    06/18/24 1509   BP: 114/76   Pulse: 80   Weight: 57.2 kg (126 lb)   Height: 154.9 cm (61\")     Body mass index is 23.81 kg/m².  Wt Readings from Last 3 Encounters:   06/18/24 57.2 kg (126 lb)   03/30/23 60.3 kg (133 lb)   02/21/23 59.1 kg (130 lb 6.4 oz)      Objective     Physical Exam  Constitutional:       General: She is not in acute distress.  Eyes:      Conjunctiva/sclera: Conjunctivae normal.   Neck:      Thyroid: No thyromegaly.   Cardiovascular:      Rate and Rhythm: Normal rate and regular rhythm.      Heart sounds: Normal heart sounds.   Pulmonary:      Effort: Pulmonary effort is normal.      Breath sounds: Normal breath sounds.   Abdominal:      General: Bowel sounds are normal.      Palpations: Abdomen is soft.   Musculoskeletal:      Right lower leg: No edema.      Left lower leg: No edema.   Lymphadenopathy:      Cervical: No cervical adenopathy.   Skin:     General: Skin is warm and dry.   Neurological:      Mental Status: She is alert and oriented to person, place, and time.   Psychiatric:         Behavior: Behavior normal.         Thought Content: Thought content normal.         Judgment: Judgment normal.         Procedures       Assessment & Plan         Diagnoses and all orders for this visit:    1. Physical exam, annual (Primary)  Comments:  Exam is favorable- will check labs today- vaccines UTD- cont healthy habits, adding exercise as she can!  Orders:  -     CBC & Differential  -     Comprehensive Metabolic Panel  -     Lipid Panel With / Chol / HDL Ratio  -     TSH    2. Anxiety  Comments:  Increase fluoxetine 30 mg daily- understands can go up from here.  Orders:  -     FLUoxetine (PROzac) 20 MG capsule; Take 1 capsule by mouth Daily.  Dispense: 90 capsule; Refill: 4  -     FLUoxetine (PROzac) 10 MG capsule; Take 1 capsule by mouth Daily.  Dispense: 90 capsule; Refill: 4        Return in about 6 months (around 12/18/2024) for Recheck, Lab Today.     "

## 2024-06-19 LAB
ALBUMIN SERPL-MCNC: 4.8 G/DL (ref 3.5–5.2)
ALBUMIN/GLOB SERPL: 2.4 G/DL
ALP SERPL-CCNC: 54 U/L (ref 39–117)
ALT SERPL-CCNC: 13 U/L (ref 1–33)
AST SERPL-CCNC: 17 U/L (ref 1–32)
BASOPHILS # BLD AUTO: 0.04 10*3/MM3 (ref 0–0.2)
BASOPHILS NFR BLD AUTO: 0.5 % (ref 0–1.5)
BILIRUB SERPL-MCNC: 0.6 MG/DL (ref 0–1.2)
BUN SERPL-MCNC: 8 MG/DL (ref 6–20)
BUN/CREAT SERPL: 8.9 (ref 7–25)
CALCIUM SERPL-MCNC: 9.9 MG/DL (ref 8.6–10.5)
CHLORIDE SERPL-SCNC: 100 MMOL/L (ref 98–107)
CHOLEST SERPL-MCNC: 178 MG/DL (ref 0–200)
CHOLEST/HDLC SERPL: 2.44 {RATIO}
CO2 SERPL-SCNC: 27.4 MMOL/L (ref 22–29)
CREAT SERPL-MCNC: 0.9 MG/DL (ref 0.57–1)
EGFRCR SERPLBLD CKD-EPI 2021: 92.9 ML/MIN/1.73
EOSINOPHIL # BLD AUTO: 0.2 10*3/MM3 (ref 0–0.4)
EOSINOPHIL NFR BLD AUTO: 2.7 % (ref 0.3–6.2)
ERYTHROCYTE [DISTWIDTH] IN BLOOD BY AUTOMATED COUNT: 11.6 % (ref 12.3–15.4)
GLOBULIN SER CALC-MCNC: 2 GM/DL
GLUCOSE SERPL-MCNC: 86 MG/DL (ref 65–99)
HCT VFR BLD AUTO: 43 % (ref 34–46.6)
HDLC SERPL-MCNC: 73 MG/DL (ref 40–60)
HGB BLD-MCNC: 14.2 G/DL (ref 12–15.9)
IMM GRANULOCYTES # BLD AUTO: 0.02 10*3/MM3 (ref 0–0.05)
IMM GRANULOCYTES NFR BLD AUTO: 0.3 % (ref 0–0.5)
LDLC SERPL CALC-MCNC: 85 MG/DL (ref 0–100)
LYMPHOCYTES # BLD AUTO: 2.94 10*3/MM3 (ref 0.7–3.1)
LYMPHOCYTES NFR BLD AUTO: 40.1 % (ref 19.6–45.3)
MCH RBC QN AUTO: 29.8 PG (ref 26.6–33)
MCHC RBC AUTO-ENTMCNC: 33 G/DL (ref 31.5–35.7)
MCV RBC AUTO: 90.3 FL (ref 79–97)
MONOCYTES # BLD AUTO: 0.41 10*3/MM3 (ref 0.1–0.9)
MONOCYTES NFR BLD AUTO: 5.6 % (ref 5–12)
NEUTROPHILS # BLD AUTO: 3.72 10*3/MM3 (ref 1.7–7)
NEUTROPHILS NFR BLD AUTO: 50.8 % (ref 42.7–76)
NRBC BLD AUTO-RTO: 0 /100 WBC (ref 0–0.2)
PLATELET # BLD AUTO: 266 10*3/MM3 (ref 140–450)
POTASSIUM SERPL-SCNC: 4.2 MMOL/L (ref 3.5–5.2)
PROT SERPL-MCNC: 6.8 G/DL (ref 6–8.5)
RBC # BLD AUTO: 4.76 10*6/MM3 (ref 3.77–5.28)
SODIUM SERPL-SCNC: 137 MMOL/L (ref 136–145)
TRIGL SERPL-MCNC: 115 MG/DL (ref 0–150)
TSH SERPL DL<=0.005 MIU/L-ACNC: 1.79 UIU/ML (ref 0.27–4.2)
VLDLC SERPL CALC-MCNC: 20 MG/DL (ref 5–40)
WBC # BLD AUTO: 7.33 10*3/MM3 (ref 3.4–10.8)

## 2025-07-30 DIAGNOSIS — F41.9 ANXIETY: ICD-10-CM
